# Patient Record
Sex: FEMALE | Race: WHITE | URBAN - METROPOLITAN AREA
[De-identification: names, ages, dates, MRNs, and addresses within clinical notes are randomized per-mention and may not be internally consistent; named-entity substitution may affect disease eponyms.]

---

## 2023-10-05 ENCOUNTER — TELEPHONE (OUTPATIENT)
Age: 53
End: 2023-10-05

## 2023-10-05 NOTE — TELEPHONE ENCOUNTER
Received a call from pt stating she was referred to see Dr. Leena Parks back in May but she hadn't called yet to make appt. Referral was supposed to come from CNP at her PCP's office through [address] [facility] but I don't see that we ever received it. I called their office at [phone] and requested hey fax the referral to our office. Will watch for referral.     Pt was previously seeing Dr. Trav Freeman through [facility] & was receiving Botox injections for migraines. She says she's overdue for her Botox injections.